# Patient Record
Sex: FEMALE | Race: WHITE | NOT HISPANIC OR LATINO | URBAN - NONMETROPOLITAN AREA
[De-identification: names, ages, dates, MRNs, and addresses within clinical notes are randomized per-mention and may not be internally consistent; named-entity substitution may affect disease eponyms.]

---

## 2017-03-29 PROBLEM — H52.4: Noted: 2017-03-29

## 2017-03-29 PROBLEM — H52.13: Noted: 2017-03-29

## 2017-03-29 PROBLEM — H52.223: Noted: 2017-03-29

## 2019-07-03 ENCOUNTER — IMPORTED ENCOUNTER (OUTPATIENT)
Dept: URBAN - NONMETROPOLITAN AREA CLINIC 1 | Facility: CLINIC | Age: 54
End: 2019-07-03

## 2019-07-03 PROCEDURE — 92310 CONTACT LENS FITTING OU: CPT

## 2019-07-03 PROCEDURE — 92014 COMPRE OPH EXAM EST PT 1/>: CPT

## 2019-07-03 PROCEDURE — 92015 DETERMINE REFRACTIVE STATE: CPT

## 2019-07-03 NOTE — PATIENT DISCUSSION
Myopia-Discussed diagnosis with patient. -Explained that people who are myopic are at a higher risk for developing RD/RT and reviewed associated S&S.-Pt to contact our office if symptoms develop. Astigmatism-Discussed diagnosis with patient. Updated spec Rx given. Updated CL Rx given.

## 2019-12-04 ENCOUNTER — IMPORTED ENCOUNTER (OUTPATIENT)
Dept: URBAN - NONMETROPOLITAN AREA CLINIC 1 | Facility: CLINIC | Age: 54
End: 2019-12-04

## 2019-12-04 PROBLEM — H52.4: Noted: 2019-12-04

## 2019-12-04 PROBLEM — H00.022: Noted: 2019-12-04

## 2019-12-04 PROBLEM — H52.13: Noted: 2019-12-04

## 2019-12-04 PROBLEM — H52.223: Noted: 2019-12-04

## 2019-12-04 PROCEDURE — 92012 INTRM OPH EXAM EST PATIENT: CPT

## 2021-06-23 ENCOUNTER — IMPORTED ENCOUNTER (OUTPATIENT)
Dept: URBAN - NONMETROPOLITAN AREA CLINIC 1 | Facility: CLINIC | Age: 56
End: 2021-06-23

## 2021-06-23 PROBLEM — H52.223: Noted: 2021-06-23

## 2021-06-23 PROBLEM — H52.13: Noted: 2021-06-23

## 2021-06-23 PROBLEM — H52.4: Noted: 2021-06-23

## 2021-06-23 PROCEDURE — 92015 DETERMINE REFRACTIVE STATE: CPT

## 2021-06-23 PROCEDURE — 92310 CONTACT LENS FITTING OU: CPT

## 2021-06-23 PROCEDURE — V2521 CNTCT LENS HYDROPHILIC TORIC: HCPCS

## 2021-06-23 PROCEDURE — 92014 COMPRE OPH EXAM EST PT 1/>: CPT

## 2022-04-15 ASSESSMENT — TONOMETRY
OS_IOP_MMHG: 15
OD_IOP_MMHG: 16
OD_IOP_MMHG: 15
OS_IOP_MMHG: 16

## 2022-04-15 ASSESSMENT — VISUAL ACUITY
OS_SC: J1
OD_SC: 20/20
OD_SC: 20/20
OS_SC: J1
OD_SC: 20/25
OS_CC: J1
OS_SC: 20/30
OD_CC: J1
OS_CC: J1

## 2022-10-10 ENCOUNTER — ESTABLISHED PATIENT (OUTPATIENT)
Dept: RURAL CLINIC 1 | Facility: CLINIC | Age: 57
End: 2022-10-10

## 2022-10-10 DIAGNOSIS — H52.13: ICD-10-CM

## 2022-10-10 DIAGNOSIS — H52.4: ICD-10-CM

## 2022-10-10 DIAGNOSIS — H52.223: ICD-10-CM

## 2022-10-10 PROCEDURE — 92015 DETERMINE REFRACTIVE STATE: CPT

## 2022-10-10 PROCEDURE — 92014 COMPRE OPH EXAM EST PT 1/>: CPT

## 2022-10-10 PROCEDURE — 92310-E CONTACT LENS FITTING ESTABLISH PATIENT

## 2022-10-10 ASSESSMENT — VISUAL ACUITY
OD_CC: 20/25
OS_CC: 20/30

## 2022-10-10 ASSESSMENT — TONOMETRY
OS_IOP_MMHG: 15
OD_IOP_MMHG: 14

## 2023-10-11 ENCOUNTER — ESTABLISHED PATIENT (OUTPATIENT)
Dept: RURAL CLINIC 1 | Facility: CLINIC | Age: 58
End: 2023-10-11

## 2023-10-11 DIAGNOSIS — H52.13: ICD-10-CM

## 2023-10-11 DIAGNOSIS — H52.4: ICD-10-CM

## 2023-10-11 DIAGNOSIS — H52.223: ICD-10-CM

## 2023-10-11 PROCEDURE — 92015 DETERMINE REFRACTIVE STATE: CPT

## 2023-10-11 PROCEDURE — 92310-E CONTACT LENS FITTING ESTABLISH PATIENT

## 2023-10-11 PROCEDURE — 92014 COMPRE OPH EXAM EST PT 1/>: CPT

## 2023-10-11 ASSESSMENT — VISUAL ACUITY
OS_CC: 20/25
OD_CC: 20/30
OD_PH: 20/40
OU_CC: 20/40
OU_CC: 20/25

## 2023-10-11 ASSESSMENT — TONOMETRY
OD_IOP_MMHG: 19
OS_IOP_MMHG: 19

## 2024-10-08 ENCOUNTER — COMPREHENSIVE EXAM (OUTPATIENT)
Dept: RURAL CLINIC 1 | Facility: CLINIC | Age: 59
End: 2024-10-08

## 2024-10-08 DIAGNOSIS — H52.223: ICD-10-CM

## 2024-10-08 DIAGNOSIS — H25.13: ICD-10-CM

## 2024-10-08 DIAGNOSIS — H52.13: ICD-10-CM

## 2024-10-08 DIAGNOSIS — H52.4: ICD-10-CM

## 2024-10-08 PROCEDURE — 92015 DETERMINE REFRACTIVE STATE: CPT

## 2024-10-08 PROCEDURE — 92310-1 LEVEL 1 CONTACT LENS MANAGEMENT

## 2024-10-08 PROCEDURE — 92014 COMPRE OPH EXAM EST PT 1/>: CPT
